# Patient Record
(demographics unavailable — no encounter records)

---

## 2024-11-20 NOTE — ASSESSMENT
[FreeTextEntry1] : left knee pain since 2018. known oa - treated with csi in past. improved with visco 3 but wore off.   finished 5/10/23.  would like to try orthovisc. left knee pain persists. some instability.  finished orthovisc on 1/10/24.  improved.   right knee pain since 2021. pain likely from pf oa. improved with visco 3.  finished visco-3 on 1/10/24.  patellar tendonitis.  as well  had best relief with Visco-3 injections- would like to repeat this

## 2024-11-20 NOTE — PROCEDURE
normal... [FreeTextEntry3] : Procedure Name: Visco-3 (Large Joint)   Viscosupplementation Injection: X-ray evidence of Osteoarthritis on this or prior visit and Patient has tried OTC's including aspirin, Ibuprofen, Aleve etc or prescription NSAIDS, and/or exercises at home and/ or physical therapy without satisfactory response.  The risks, benefits, and alternatives to Viscosupplementation injection were explained in full to the patient. Risks outlined include but are not limited to infection, sepsis, bleeding, scarring, skin discoloration, temporary increase in pain, syncopal episode, failure to resolve symptoms, allergic reaction, and symptom recurrence. Signs and symptoms of infection reviewed and patient advised to call immediately for redness, fevers, and/or chills. Patient understood the risks. All questions were answered.     Oral informed consent was obtained.   Sterile technique was utilized for the procedure including the preparation of the solutions used for the injection. An injection of Visco-3 injection #1 was injected into BILATERAL KNEES.  Patient tolerated the procedure well. Advised to ice the injection site this evening.  Post Procedure Instructions: Patient was advised to call if redness, pain, or fever occur and apply ice for 15 min. out of every hour for the next 12-24 hours as tolerated. patient was advised to rest the joint(s) for 2 days.    Ultrasound Extremity (85337) was used because of the following reasons: inflammation.   Ultrasound Guidance was used for the following reasons: for accurate placement of needle into knee joint.   Diagnostic ultrasound was performed of the knee and is positive for synovitis.   Ultrasound guided injection was performed of the knee, visualization of the needle and placement of injection was performed without complication.

## 2024-11-20 NOTE — PHYSICAL EXAM
[5___] : hamstring 5[unfilled]/5 [Equivocal] : equivocal Jonatan [Right] : right knee [NL (0)] : extension 0 degrees [4___] : hamstring 4[unfilled]/5 [Left] : left knee [Degenerative change] : Degenerative change [] : no erythema [FreeTextEntry3] : no bursal swelling or signs of infection.  [FreeTextEntry8] : no defect noted.  [de-identified] : able to slr without pain or difficulty [TWNoteComboBox7] : flexion 120 degrees

## 2024-11-20 NOTE — HISTORY OF PRESENT ILLNESS
[] : This patient has had an injection before: yes [1] : 1 [de-identified] : 12/21/22:  bilateral knee pain.  3/22/23: Here for f/up bilateral knees.  4/5/23:  bilateral knee pain persists.  4/26/23:  bilateral knee pain still.  5/10/23:  bilateral knee pain continues.  9/20/23:  bilateral knee improved but still some swelling. 10/18/23:  follow up bilateral knees.   left knee worse than right. 12/13/23:  left knee pain continues.  csi last visit with some temp relief.  12/27/23:  left knee pain still.  also right knee pain continues.  1/3/24: left knee pain persists.  right knee is feeling better. wearing knee support sleeve. improvement in pain with stairs. no pain at rest. start up pain and stiffness anterior knee after sitting prolonged.  1/10/24:  bilateral knee pain.  2/7/24:   follow up right knee. slight increase right knee during pt.  is resolving but mild pain at times.  7/31/24: f/up bilateral knee pain  11/20/24: Visco-3 #1 jamari knees  [FreeTextEntry1] : KIKE KNEE

## 2024-12-06 NOTE — PROCEDURE
[FreeTextEntry3] : Procedure Name: Visco-3 (Large Joint)   Viscosupplementation Injection: X-ray evidence of Osteoarthritis on this or prior visit and Patient has tried OTC's including aspirin, Ibuprofen, Aleve etc or prescription NSAIDS, and/or exercises at home and/ or physical therapy without satisfactory response.  The risks, benefits, and alternatives to Viscosupplementation injection were explained in full to the patient. Risks outlined include but are not limited to infection, sepsis, bleeding, scarring, skin discoloration, temporary increase in pain, syncopal episode, failure to resolve symptoms, allergic reaction, and symptom recurrence. Signs and symptoms of infection reviewed and patient advised to call immediately for redness, fevers, and/or chills. Patient understood the risks. All questions were answered.     Oral informed consent was obtained.   Sterile technique was utilized for the procedure including the preparation of the solutions used for the injection. An injection of Visco-3 injection #2 was injected into BILATERAL KNEES.  Patient tolerated the procedure well. Advised to ice the injection site this evening.  Post Procedure Instructions: Patient was advised to call if redness, pain, or fever occur and apply ice for 15 min. out of every hour for the next 12-24 hours as tolerated. patient was advised to rest the joint(s) for 2 days.    Ultrasound Extremity (64409) was used because of the following reasons: inflammation.   Ultrasound Guidance was used for the following reasons: for accurate placement of needle into knee joint.   Diagnostic ultrasound was performed of the knee and is positive for synovitis.   Ultrasound guided injection was performed of the knee, visualization of the needle and placement of injection was performed without complication.

## 2024-12-06 NOTE — PHYSICAL EXAM
[5___] : hamstring 5[unfilled]/5 [Equivocal] : equivocal Jonatan [Right] : right knee [NL (0)] : extension 0 degrees [4___] : hamstring 4[unfilled]/5 [Left] : left knee [Degenerative change] : Degenerative change [] : no erythema [FreeTextEntry3] : no bursal swelling or signs of infection.  [FreeTextEntry8] : no defect noted.  [de-identified] : able to slr without pain or difficulty [TWNoteComboBox7] : flexion 120 degrees

## 2024-12-06 NOTE — DISCUSSION/SUMMARY
[de-identified] : Inj as above HEP f/up 1 week to continue  The patient's orthopaedic condition(s) warrants consideration of consistent or intermittent use of a prescription strength non-steroidal anti-inflammatory medication.  These medications are associated with risks including but not limited to gastrointestinal irritation, kidney damage, hypertension, and bleeding.    Although clinically indicated, the patient defers taking such medications at this time.  Progress Note completed by Keila LEWIS The PA-C assigned on this date is under my supervision and saw this patient independently on this visit. I was in the office suite at the time.  I have periodically reviewed the patient chart as needed and I continue to oversee the medical decision making and care. -Dr. Boone

## 2024-12-06 NOTE — HISTORY OF PRESENT ILLNESS
[] : This patient has had an injection before: yes [1] : 1 [de-identified] : 12/21/22:  bilateral knee pain.  3/22/23: Here for f/up bilateral knees.  4/5/23:  bilateral knee pain persists.  4/26/23:  bilateral knee pain still.  5/10/23:  bilateral knee pain continues.  9/20/23:  bilateral knee improved but still some swelling. 10/18/23:  follow up bilateral knees.   left knee worse than right. 12/13/23:  left knee pain continues.  csi last visit with some temp relief.  12/27/23:  left knee pain still.  also right knee pain continues.  1/3/24: left knee pain persists.  right knee is feeling better. wearing knee support sleeve. improvement in pain with stairs. no pain at rest. start up pain and stiffness anterior knee after sitting prolonged.  1/10/24:  bilateral knee pain.  2/7/24:   follow up right knee. slight increase right knee during pt.  is resolving but mild pain at times.  7/31/24: f/up bilateral knee pain  11/20/24: Visco-3 #1 jamari knees  12/06/24 follow up inj #2 visco 3  [FreeTextEntry1] : KIKE KNEE

## 2024-12-18 NOTE — PHYSICAL EXAM
[5___] : hamstring 5[unfilled]/5 [Equivocal] : equivocal Jonatan [Right] : right knee [NL (0)] : extension 0 degrees [4___] : hamstring 4[unfilled]/5 [Left] : left knee [Degenerative change] : Degenerative change [] : no erythema [FreeTextEntry3] : no bursal swelling or signs of infection.  [FreeTextEntry8] : no defect noted.  [de-identified] : able to slr without pain or difficulty [TWNoteComboBox7] : flexion 120 degrees

## 2024-12-18 NOTE — HISTORY OF PRESENT ILLNESS
[] : This patient has had an injection before: yes [3] : 3 [Other:____] : [unfilled] [de-identified] : 12/21/22:  bilateral knee pain.  3/22/23: Here for f/up bilateral knees.  4/5/23:  bilateral knee pain persists.  4/26/23:  bilateral knee pain still.  5/10/23:  bilateral knee pain continues.  9/20/23:  bilateral knee improved but still some swelling. 10/18/23:  follow up bilateral knees.   left knee worse than right. 12/13/23:  left knee pain continues.  csi last visit with some temp relief.  12/27/23:  left knee pain still.  also right knee pain continues.  1/3/24: left knee pain persists.  right knee is feeling better. wearing knee support sleeve. improvement in pain with stairs. no pain at rest. start up pain and stiffness anterior knee after sitting prolonged.  1/10/24:  bilateral knee pain.  2/7/24:   follow up right knee. slight increase right knee during pt.  is resolving but mild pain at times.  7/31/24: f/up bilateral knee pain  11/20/24: Visco-3 #1 jamari knees  12/06/24 follow up inj #2 visco 3  12/18/24: Visco-3 #3 jamari knees  [FreeTextEntry1] : KIKE KNEE [de-identified] : HEP

## 2024-12-18 NOTE — PROCEDURE
[FreeTextEntry3] : Procedure Name: Visco-3 (Large Joint)   Viscosupplementation Injection: X-ray evidence of Osteoarthritis on this or prior visit and Patient has tried OTC's including aspirin, Ibuprofen, Aleve etc or prescription NSAIDS, and/or exercises at home and/ or physical therapy without satisfactory response.  The risks, benefits, and alternatives to Viscosupplementation injection were explained in full to the patient. Risks outlined include but are not limited to infection, sepsis, bleeding, scarring, skin discoloration, temporary increase in pain, syncopal episode, failure to resolve symptoms, allergic reaction, and symptom recurrence. Signs and symptoms of infection reviewed and patient advised to call immediately for redness, fevers, and/or chills. Patient understood the risks. All questions were answered.     Oral informed consent was obtained.   Sterile technique was utilized for the procedure including the preparation of the solutions used for the injection. An injection of Visco-3 injection #3 was injected into BILATERAL KNEES.  Patient tolerated the procedure well. Advised to ice the injection site this evening.  Post Procedure Instructions: Patient was advised to call if redness, pain, or fever occur and apply ice for 15 min. out of every hour for the next 12-24 hours as tolerated. patient was advised to rest the joint(s) for 2 days.    Ultrasound Extremity (90258) was used because of the following reasons: inflammation.   Ultrasound Guidance was used for the following reasons: for accurate placement of needle into knee joint.   Diagnostic ultrasound was performed of the knee and is positive for synovitis.   Ultrasound guided injection was performed of the knee, visualization of the needle and placement of injection was performed without complication.

## 2024-12-18 NOTE — DISCUSSION/SUMMARY
[de-identified] : Inj as above HEP f/up 6 weeks prn  The patient's orthopaedic condition(s) warrants consideration of consistent or intermittent use of a prescription strength non-steroidal anti-inflammatory medication.  These medications are associated with risks including but not limited to gastrointestinal irritation, kidney damage, hypertension, and bleeding.    Although clinically indicated, the patient defers taking such medications at this time.  Progress Note completed by Shabnam Ch PA-C The SUDHEER assigned on this date is under my supervision and saw this patient independently on this visit. I was in the office suite at the time.  I have periodically reviewed the patient chart as needed and I continue to oversee the medical decision making and care. -Dr. Boone

## 2025-01-29 NOTE — HISTORY OF PRESENT ILLNESS
[FreeTextEntry1] : follow up chronic medical conditions.  [de-identified] : Mr. MICHELLE BRADLEY is a 70 year male comes to the office for physical exam. Patient denies fever, cough SOB. No other complaints at this time.

## 2025-01-29 NOTE — PLAN
[FreeTextEntry1] : Patient will continue to follow with Cardiologist Dr. Cain  Regarding patient's obesity - encourage lifestyle modifications - diet and exercise - reduce calorie intake - no soda/ junk food/ snacks - consider mixed grains/ whole grains, leafy vegetables, and an appropriate serving of protein   Patient requesting Zepbound, patient advised that it is not covered by insurance.   Prior to appointment and during encounter with patient extensive medical records were reviewed including but not limited to, Hospital records, out patient records, laboratory data and microbiology data    Total encounter total time 30 mins >50% of time spent counseling/coordinating care  Counseling included abnormal lab results, differential diagnoses, treatment options, risks and benefits, lifestyle changes, current condition, medications, and dose adjustments.  The patient was interactive, attentive, asked questions, and verbalized understanding

## 2025-01-29 NOTE — HEALTH RISK ASSESSMENT
[Good] : ~his/her~  mood as  good [Yes] : Yes [Monthly or less (1 pt)] : Monthly or less (1 point) [1 or 2 (0 pts)] : 1 or 2 (0 points) [Never (0 pts)] : Never (0 points) [No] : In the past 12 months have you used drugs other than those required for medical reasons? No [0] : 2) Feeling down, depressed, or hopeless: Not at all (0) [PHQ-2 Negative - No further assessment needed] : PHQ-2 Negative - No further assessment needed [Audit-CScore] : 1 [[id number]] : 0 [Never] : Never [Patient declined Low Dose CT Scan] : Patient declined Low Dose CT Scan [Patient declined Retinal Exam] : Patient declined Retinal Exam. [Patient declined colonoscopy] : Patient declined colonoscopy [HIV test declined] : HIV test declined [Hepatitis C test declined] : Hepatitis C test declined

## 2025-07-16 NOTE — ASSESSMENT
[FreeTextEntry1] : left knee pain since 2018. known oa - treated with csi in past. improved with visco 3 finished 5/10/23.  some instability.  finished visco-3 on 1/10/24. Improved with gel but some increased pain recently with no injury.   This is likely an exacerbation of a chronic condition.  right knee pain since 2021. pain likely from pf oa. improved with visco 3 finished on 1/10/24.  patellar tendonitis.  Improved with gel but some increased pain recently with no injury.  This is likely an exacerbation of a chronic condition.

## 2025-07-16 NOTE — DISCUSSION/SUMMARY
[de-identified] : The patient has documented cartilage loss and osteoarthritis in one or more joints.  The patient has persistent symptoms despite attempts at treatment to this date including activity modification, medications and other treatments.  The patient is a candidate for Visco supplementation to treat the osteoarthritis.  The risks, benefits, alternatives and the nature of the treatment have been discussed with the patient including but not limited to infection, continued pain, and failure of treatment. We will request authorization to administer Visco supplementation therapy.  The following medication is requested - Visco-3 bilateral knees.   The patient should perform a home exercise program as directed. The patient should focus on the body parts affected as discussed above.  The patient's orthopaedic condition(s) warrants consideration of consistent or intermittent use of a prescription strength non-steroidal anti-inflammatory medication.  These medications are associated with risks including but not limited to gastrointestinal irritation, kidney damage, hypertension, and bleeding.    Although clinically indicated, the patient defers taking such medications at this time.  The patient's history, physical examination and imaging studies are consistent with advanced knee osteoarthritis.  The patient is a candidate for arthroplasty.  The risks, benefits, alternatives, and likely post operative course were discussed in detail with the patient, including but not limited to infection, neurovascular injury, need for further treatment, medical and anesthetic complications, as well as loss of limb and loss of life. The patient understands and defers arthroplasty at this time.  Follow up for gel injections.

## 2025-07-16 NOTE — HISTORY OF PRESENT ILLNESS
[0] : 0 [Localized] : localized [Injection therapy] : injection therapy [] : This patient has had an injection before: yes [3] : 3 [Other:____] : [unfilled] [de-identified] : 12/21/22:  bilateral knee pain.  3/22/23: Here for f/up bilateral knees.  4/5/23:  bilateral knee pain persists.  4/26/23:  bilateral knee pain still.  5/10/23:  bilateral knee pain continues.  9/20/23:  bilateral knee improved but still some swelling. 10/18/23:  follow up bilateral knees.   left knee worse than right. 12/13/23:  left knee pain continues.  csi last visit with some temp relief.  12/27/23:  left knee pain still.  also right knee pain continues.  1/3/24: left knee pain persists.  right knee is feeling better. wearing knee support sleeve. improvement in pain with stairs. no pain at rest. start up pain and stiffness anterior knee after sitting prolonged.  1/10/24:  bilateral knee pain.  2/7/24:   follow up right knee. slight increase right knee during pt.  is resolving but mild pain at times.  7/31/24: f/up bilateral knee pain  11/20/24: Visco-3 #1 jamari knees  12/06/24 follow up inj #2 visco 3  12/18/24: Visco-3 #3 jamari knees  7/16/25: Follow up bilateral knees, improved with gel but some increased pain recently with no injury. [FreeTextEntry1] : KIKE KNEE [FreeTextEntry9] : gel series  [de-identified] : Visco-3 series in [place] knees

## 2025-07-16 NOTE — PHYSICAL EXAM
[5___] : hamstring 5[unfilled]/5 [Equivocal] : equivocal Jonatan [Right] : right knee [NL (0)] : extension 0 degrees [4___] : hamstring 4[unfilled]/5 [Left] : left knee [Degenerative change] : Degenerative change [] : no erythema [FreeTextEntry3] : no bursal swelling or signs of infection.  [FreeTextEntry8] : no defect noted.  [de-identified] : able to slr without pain or difficulty [TWNoteComboBox7] : flexion 120 degrees

## 2025-07-30 NOTE — PHYSICAL EXAM
[Left] : left knee [5___] : hamstring 5[unfilled]/5 [Equivocal] : equivocal Jonatan [Right] : right knee [NL (0)] : extension 0 degrees [4___] : hamstring 4[unfilled]/5 [Degenerative change] : Degenerative change [] : no erythema [FreeTextEntry3] : no bursal swelling or signs of infection.  [FreeTextEntry8] : no defect noted.  [de-identified] : able to slr without pain or difficulty [TWNoteComboBox7] : flexion 120 degrees

## 2025-07-30 NOTE — DISCUSSION/SUMMARY
[de-identified] : The patient should perform a home exercise program as directed. The patient should focus on the body parts affected as discussed above.  The patient's orthopaedic condition(s) warrants consideration of consistent or intermittent use of a prescription strength non-steroidal anti-inflammatory medication.  These medications are associated with risks including but not limited to gastrointestinal irritation, kidney damage, hypertension, and bleeding.    Although clinically indicated, the patient defers taking such medications at this time.  The patient's history, physical examination and imaging studies are consistent with advanced knee osteoarthritis.  The patient is a candidate for arthroplasty.  The risks, benefits, alternatives, and likely post operative course were discussed in detail with the patient, including but not limited to infection, neurovascular injury, need for further treatment, medical and anesthetic complications, as well as loss of limb and loss of life. The patient understands and defers arthroplasty at this time.  The patient has documented cartilage loss and osteoarthritis in one or more joints.  The patient has persistent symptoms despite attempts at treatment to this date including activity modification, medications and other treatments.  The patient is a candidate for Visco supplementation to treat the osteoarthritis.  The risks, benefits, alternatives and the nature of the treatment have been discussed with the patient including but not limited to infection, continued pain, and failure of treatment. Will proceed to start series of injections on this visit  Follow up 1 week to continue

## 2025-07-30 NOTE — ASSESSMENT
[FreeTextEntry1] : Left knee pain since 2018. known oa. Finished visco-3 on 1/10/24. Improved with gel but some increased pain recently with no injury.   This is likely an exacerbation of a chronic condition.  Right knee pain since 2021. Pain likely from pf oa. Improved with visco 3 finished on 1/10/24.  Recurrent pain This is likely an exacerbation of a chronic condition.

## 2025-07-30 NOTE — HISTORY OF PRESENT ILLNESS
[0] : 0 [Localized] : localized [Injection therapy] : injection therapy [] : This patient has had an injection before: yes [3] : 3 [Other:____] : [unfilled] [de-identified] : 12/21/22:  bilateral knee pain.  3/22/23: Here for f/up bilateral knees.  4/5/23:  bilateral knee pain persists.  4/26/23:  bilateral knee pain still.  5/10/23:  bilateral knee pain continues.  9/20/23:  bilateral knee improved but still some swelling. 10/18/23:  follow up bilateral knees.   left knee worse than right. 12/13/23:  left knee pain continues.  csi last visit with some temp relief.  12/27/23:  left knee pain still.  also right knee pain continues.  1/3/24: left knee pain persists.  right knee is feeling better. wearing knee support sleeve. improvement in pain with stairs. no pain at rest. start up pain and stiffness anterior knee after sitting prolonged.  1/10/24:  bilateral knee pain.  2/7/24:   follow up right knee. slight increase right knee during pt.  is resolving but mild pain at times.  7/31/24: f/up bilateral knee pain  11/20/24: Visco-3 #1 jamari knees  12/06/24 follow up inj #2 visco 3  12/18/24: Visco-3 #3 jamari knees  7/16/25: Follow up bilateral knees, improved with gel but some increased pain recently with no injury. 7/30/25: Bilateral knee pain persists. [FreeTextEntry1] : KIKE KNEE [FreeTextEntry9] : gel series  [de-identified] : Visco-3 series in [place] knees